# Patient Record
Sex: MALE | Race: WHITE | NOT HISPANIC OR LATINO | Employment: UNEMPLOYED | URBAN - METROPOLITAN AREA
[De-identification: names, ages, dates, MRNs, and addresses within clinical notes are randomized per-mention and may not be internally consistent; named-entity substitution may affect disease eponyms.]

---

## 2017-05-24 ENCOUNTER — OFFICE VISIT (OUTPATIENT)
Dept: PAIN MEDICINE | Facility: CLINIC | Age: 42
End: 2017-05-24

## 2017-05-24 VITALS
OXYGEN SATURATION: 97 % | DIASTOLIC BLOOD PRESSURE: 81 MMHG | WEIGHT: 158.8 LBS | RESPIRATION RATE: 18 BRPM | HEART RATE: 79 BPM | TEMPERATURE: 97.9 F | BODY MASS INDEX: 20.39 KG/M2 | SYSTOLIC BLOOD PRESSURE: 119 MMHG

## 2017-05-24 DIAGNOSIS — M54.14 THORACIC RADICULITIS: ICD-10-CM

## 2017-05-24 DIAGNOSIS — G89.4 CHRONIC PAIN SYNDROME: Primary | ICD-10-CM

## 2017-05-24 DIAGNOSIS — M54.6 THORACIC SPINE PAIN: ICD-10-CM

## 2017-05-24 DIAGNOSIS — M54.16 LUMBAR RADICULOPATHY: ICD-10-CM

## 2017-05-24 LAB
POC AMPHETAMINES: NEGATIVE
POC BARBITURATES: NEGATIVE
POC BENZODIAZEPHINES: NEGATIVE
POC COCAINE: NEGATIVE
POC METHADONE: NEGATIVE
POC METHAMPHETAMINE SCREEN URINE: NEGATIVE
POC OPIATES: NEGATIVE
POC OXYCODONE: NEGATIVE
POC PHENCYCLIDINE: NEGATIVE
POC PROPOXYPHENE: NEGATIVE
POC THC: POSITIVE
POC TRICYCLIC ANTIDEPRESSANTS: POSITIVE

## 2017-05-24 PROCEDURE — 99214 OFFICE O/P EST MOD 30 MIN: CPT | Performed by: ANESTHESIOLOGY

## 2017-05-24 PROCEDURE — 80305 DRUG TEST PRSMV DIR OPT OBS: CPT | Performed by: ANESTHESIOLOGY

## 2017-05-24 RX ORDER — CYCLOBENZAPRINE HCL 10 MG
TABLET ORAL
Refills: 0 | COMMUNITY
Start: 2017-04-26

## 2017-05-24 RX ORDER — ERGOCALCIFEROL 1.25 MG/1
CAPSULE ORAL
Refills: 0 | COMMUNITY
Start: 2017-04-26

## 2017-05-24 RX ORDER — PAROXETINE HYDROCHLORIDE 40 MG/1
TABLET, FILM COATED ORAL
Refills: 0 | COMMUNITY
Start: 2017-05-17 | End: 2018-02-22 | Stop reason: ALTCHOICE

## 2017-08-07 ENCOUNTER — OFFICE VISIT (OUTPATIENT)
Dept: PAIN MEDICINE | Facility: CLINIC | Age: 42
End: 2017-08-07

## 2017-08-07 VITALS
SYSTOLIC BLOOD PRESSURE: 135 MMHG | DIASTOLIC BLOOD PRESSURE: 90 MMHG | BODY MASS INDEX: 20.12 KG/M2 | WEIGHT: 156.8 LBS | OXYGEN SATURATION: 96 % | HEART RATE: 105 BPM | RESPIRATION RATE: 18 BRPM | HEIGHT: 74 IN | TEMPERATURE: 98.8 F

## 2017-08-07 DIAGNOSIS — M54.16 LUMBAR RADICULOPATHY: ICD-10-CM

## 2017-08-07 DIAGNOSIS — M54.12 CERVICAL RADICULOPATHY: ICD-10-CM

## 2017-08-07 DIAGNOSIS — G89.4 CHRONIC PAIN SYNDROME: Primary | ICD-10-CM

## 2017-08-07 DIAGNOSIS — M54.6 PAIN IN THORACIC SPINE AT MULTIPLE SITES: ICD-10-CM

## 2017-08-07 DIAGNOSIS — M54.14 THORACIC RADICULITIS: ICD-10-CM

## 2017-08-07 PROCEDURE — 99214 OFFICE O/P EST MOD 30 MIN: CPT | Performed by: ANESTHESIOLOGY

## 2017-08-07 NOTE — PROGRESS NOTES
CHIEF COMPLAINT  Follow-up for back and neck pain. Mr. Carvajal states that his neck and low back pain is unchanged and his upper and mid-back pain has gotten worse.    Subjective   Korey Carvajal is a 41 y.o. male  who presents to the office for follow-up.He has a history of mid and low back pain.    Worsening pain despite observation and conservative treatment for 10 weeks.      Back Pain   This is a chronic problem. The current episode started more than 1 year ago. The problem occurs constantly. The problem has been gradually worsening since onset. The pain is present in the lumbar spine and thoracic spine. The quality of the pain is described as aching, burning and shooting. Radiates to: shoots to flanks from the mid-thoracic, and into the neck.  shoots to neck to both shoulders.  N/T in fingertips.  The pain is severe. The symptoms are aggravated by bending, standing and twisting. Pertinent negatives include no chest pain, headaches, numbness or weakness. He has tried analgesics, bed rest, ice, muscle relaxant, NSAIDs, walking, heat and home exercises (acupuntcutre.  cupping.  previous failure with NSAIDS at prescription doses for > 6 wks, and this was prior to the May 2017 office visit so we elected not to restart them) for the symptoms. The treatment provided no relief.        PEG Assessment   What number best describes your pain on average in the past week?7  What number best describes how, during the past week, pain has interfered with your enjoyment of life?7  What number best describes how, during the past week, pain has interfered with your general activity?  7      The following portions of the patient's history were reviewed and updated as appropriate: allergies, current medications, past family history, past medical history, past social history, past surgical history and problem list.    Review of Systems   Constitutional: Positive for fatigue.   HENT: Negative for congestion.    Eyes: Positive for  "visual disturbance (blurry vision).   Respiratory: Positive for cough, shortness of breath and wheezing.    Cardiovascular: Positive for palpitations. Negative for chest pain and leg swelling.   Gastrointestinal: Negative for constipation and diarrhea.   Genitourinary: Negative for difficulty urinating.   Musculoskeletal: Positive for back pain and neck pain. Negative for neck stiffness.   Neurological: Negative for weakness, numbness and headaches.   Psychiatric/Behavioral: Positive for sleep disturbance. Negative for suicidal ideas. The patient is nervous/anxious.          Vitals:    08/07/17 0842   BP: 135/90   Pulse: 105   Resp: 18   Temp: 98.8 °F (37.1 °C)   SpO2: 96%   Weight: 156 lb 12.8 oz (71.1 kg)   Height: 74\" (188 cm)   PainSc: 6  Comment: neck pain 6/10   PainLoc: Back           Objective   Physical Exam   Constitutional: He is oriented to person, place, and time. He appears well-developed and well-nourished.   HENT:   Head: Normocephalic and atraumatic.   Right Ear: External ear normal.   Left Ear: External ear normal.   Eyes: Conjunctivae and EOM are normal. Pupils are equal, round, and reactive to light.   Neck: Neck supple. Muscular tenderness present. No rigidity. Decreased range of motion present. No Brudzinski's sign and no Kernig's sign noted.   Pain on neck rotation to 80 deg to the left and 70 deg to the right.  Crepitus is audible.     Pulmonary/Chest: Effort normal.   Abdominal: Soft.   Musculoskeletal:        Thoracic back: He exhibits decreased range of motion, tenderness and pain.        Lumbar back: He exhibits decreased range of motion, tenderness and pain.   Neurological: He is alert and oriented to person, place, and time. He has normal strength. No cranial nerve deficit. He displays a negative Romberg sign. Gait normal.   Reflex Scores:       Patellar reflexes are 1+ on the right side and 1+ on the left side.  Pain on lateral flexion in the thoracic spine with shooting pains out " laterally bilaterally R>L.  Also has positive SLR bilaterally   Skin: Skin is warm and dry.   Psychiatric: He has a normal mood and affect. His behavior is normal.   Nursing note and vitals reviewed.          Assessment/Plan   Korey was seen today for back pain and neck pain.    Diagnoses and all orders for this visit:    Chronic pain syndrome    Thoracic radiculitis  -     Case Request  -     MRI Thoracic Spine Without Contrast; Future    Cervical radiculopathy    Lumbar radiculopathy  -     MRI Lumbar Spine Without Contrast; Future    Pain in thoracic spine at multiple sites  -     MRI Thoracic Spine Without Contrast; Future        --- Follow-up for procedure.....    Thoracic epidural steroid injection, x1, then reevaluate    Prefer to do this therapy after imaging.... He needs a new T & L spine MRI... Not improving now 10.5 wks later with attention to HEP & modalities.  Previous failure with PT.  He has tried acupuncture & cupping since the last visit and did not see improvement with these modalities.  He tries to stay active but to little avail.               TAYLOR REPORT  TAYLOR report has been reviewed and scanned into the patient's chart.  Date of last TAYLOR : as above.  No current use of opioids.        EMR Dragon/Transcription disclaimer:   Much of this encounter note is an electronic transcription/translation of spoken language to printed text. The electronic translation of spoken language may permit erroneous, or at times, nonsensical words or phrases to be inadvertently transcribed; Although I have reviewed the note for such errors, some may still exist.

## 2017-08-29 ENCOUNTER — DOCUMENTATION (OUTPATIENT)
Dept: PAIN MEDICINE | Facility: CLINIC | Age: 42
End: 2017-08-29

## 2017-08-29 ENCOUNTER — OUTSIDE FACILITY SERVICE (OUTPATIENT)
Dept: PAIN MEDICINE | Facility: CLINIC | Age: 42
End: 2017-08-29

## 2017-08-29 PROCEDURE — 62320 NJX INTERLAMINAR CRV/THRC: CPT | Performed by: ANESTHESIOLOGY

## 2017-08-30 NOTE — PROGRESS NOTES
Thoracic Epidural Steroid Injection  [facility]      PREOPERATIVE DIAGNOSIS:   Thoracic radiculopathy R>L, chronic pain syndrome, thoracic spine pain  POSTOPERATIVE DIAGNOSIS:  Same as preop diagnosis    PROCEDURE:   Thoracic Epidural Steroid Injection, Therapeutic Translaminar Injection, with epidurogram, at  T7-8 level    PRE-PROCEDURE DISCUSSION WITH PATIENT:    Risks and complications were discussed with the patient prior to starting the procedure and informed consent was obtained.  We discussed various topics including but not limited to bleeding, infection, injury, paralysis, nerve injury, dural puncture, coma, death, worsening of clinical picture, lack of pain relief, and postprocedural soreness.    SURGEON:  Niall Gray MD    REASON FOR PROCEDURE:    Previous VITALIY was therapeutically significant.  He has radicular pains that seem to eminate from this area     SEDATION:  Versed 6mg & Fentanyl 100 mcg IV  ANESTHETIC:  Marcaine 0.25%  STEROID:   Methylprednisolone (DEPO MEDROL) 80mg/ml    DESCRIPTON OF PROCEDURE:    After obtaining informed consent, I.V. was started in the preop area.   The patient was taken to the operating room and placed in the prone position.  EKG, blood pressure, and pulse oximeter were monitored throughout, and sedation was provided as needed by the RN under my guidance. All pressure points were well padded.  The thoracic spine area was prepped with Chloraprep and draped in a sterile fashion.  Under fluoroscopic guidance, the above mentioned interlaminar space was identified. Skin and subcutaneous tissues were anesthetized with 1% lidocaine in the middle of the space. A 20-gauge Tuohy needle was introduced through the skin and advanced to this interlaminar space and into the epidural space under fluoroscopic guidance and verified with loss-of-resistance technique to air.  After confirming the position of the needle with the fluoroscope with all the views, and after  aspiration was confirmed negative for blood and CSF, 1.5 mL of Omnipaque was injected.  After seeing appropriate epidurogram with lateral and PA views, a total of 3 cc solution was injected, consisting of 1cc of local anesthetic as above, with normal saline and injectable steroid as above.       ESTIMATED BLOOD LOSS:  <5 mL  SPECIMENS:  None    COMPLICATIONS:     No complications were noted. and There was no indication of vascular uptake on live injection of contrast dye.    TOLERANCE & DISCHARGE CONDITION:    The patient tolerated the procedure well.  The patient was transported to the recovery area without difficulties.  The patient was discharged to home under the care of family in stable and satisfactory condition.    PLAN OF CARE:  1. The patient was given our standard instruction sheet.  2. The patient will Return to clinic 2 wks  3. The patient will resume all medications as per the medication reconciliation sheet.

## 2017-09-13 ENCOUNTER — OFFICE VISIT (OUTPATIENT)
Dept: PAIN MEDICINE | Facility: CLINIC | Age: 42
End: 2017-09-13

## 2017-09-13 VITALS
OXYGEN SATURATION: 99 % | RESPIRATION RATE: 16 BRPM | TEMPERATURE: 98.6 F | DIASTOLIC BLOOD PRESSURE: 81 MMHG | WEIGHT: 154 LBS | HEIGHT: 74 IN | BODY MASS INDEX: 19.76 KG/M2 | SYSTOLIC BLOOD PRESSURE: 120 MMHG | HEART RATE: 85 BPM

## 2017-09-13 DIAGNOSIS — M51.36 DDD (DEGENERATIVE DISC DISEASE), LUMBAR: ICD-10-CM

## 2017-09-13 DIAGNOSIS — M54.14 THORACIC RADICULITIS: ICD-10-CM

## 2017-09-13 DIAGNOSIS — M54.6 PAIN IN THORACIC SPINE AT MULTIPLE SITES: Primary | ICD-10-CM

## 2017-09-13 PROCEDURE — 99214 OFFICE O/P EST MOD 30 MIN: CPT | Performed by: NURSE PRACTITIONER

## 2017-09-13 NOTE — PROGRESS NOTES
CHIEF COMPLAINT  Pt has had 50% pain reduction for about 11 days following 8/29/17 T 7-8 VITALIY  Initial evaluation by Payal Garnett   Korey Carvajal is a 41 y.o. male  who presents to the office for follow-up of procedure.  He completed a TESI T7-8   on  8-29-17 performed by Dr. Gray for management of mid back pain. Patient reports 50% relief from the procedure for 11 days.  He is starting to notice that the pain is re-emerging, though not quite to pre-procedure pain level.     Patient was last evaluated by Dr. Gray in office on 8/7/17.  He has a history of back and neck pain.  He presents the office with complaints of increased mid and low back pain.  He is ordered to have an MRI of the thoracic and lumbar spine.  He is also ordered to have a thoracic epidural injection times one.  Dr. Gray request a new  Thoracic and lumbar spine MRI after patient has done 10.5 treatment, weeks with attention HEP and conservative modalities and previous failure PT.  Has tried acupuncture cupping  but no results.    Has a court date for child support. His PCP wrote he is unable to work. HE was wondering if Dr. Gray would be willing to write a letter on his behalf. I sent a message to Dr. Gray in regards to this.     As for the MRI's,t he request has not yet been sent in. He was denied 6-28-17 previously and they would not review again until 90 days after. Will send for request when time is appropriate.    He is requesting repeating a TFLESI. He had this done previously, last performed in 2015. He noted significant relief with this.      Back Pain   This is a chronic problem. The current episode started more than 1 year ago. The problem occurs constantly. The pain is present in the lumbar spine and thoracic spine. The quality of the pain is described as aching, burning and shooting. Radiates to: shoots to flanks from the mid-thoracic, and into the neck.  shoots to neck to both shoulders.  N/T in  fingertips.  The pain is at a severity of 7/10. The pain is severe. The symptoms are aggravated by bending, standing and twisting. Pertinent negatives include no bladder incontinence, bowel incontinence, chest pain, headaches, numbness (fingers,bilat. hands) or weakness. He has tried analgesics, bed rest, ice, muscle relaxant, NSAIDs, walking, heat and home exercises (acupuntcutre.  cupping.  previous failure with NSAIDS at prescription doses for > 6 wks, and this was prior to the May 2017 office visit so we elected not to restart them; TESI--moderate relief) for the symptoms. The treatment provided no relief.      PEG Assessment   What number best describes your pain on average in the past week?7  What number best describes how, during the past week, pain has interfered with your enjoyment of life?7  What number best describes how, during the past week, pain has interfered with your general activity?  7    The following portions of the patient's history were reviewed and updated as appropriate: allergies, current medications, past family history, past medical history, past social history, past surgical history and problem list.    Review of Systems   Constitutional: Positive for activity change (decreased), appetite change (minimal) and fatigue.   HENT: Negative for congestion.    Eyes: Positive for visual disturbance (blurry vision  occasional).   Respiratory: Positive for cough, shortness of breath and wheezing.    Cardiovascular: Positive for palpitations. Negative for chest pain and leg swelling.   Gastrointestinal: Negative for bowel incontinence, constipation, diarrhea and nausea.   Genitourinary: Negative for bladder incontinence and difficulty urinating.   Musculoskeletal: Positive for back pain and neck pain. Negative for neck stiffness.   Neurological: Negative for dizziness, weakness, light-headedness, numbness (fingers,bilat. hands) and headaches.   Psychiatric/Behavioral: Positive for sleep  "disturbance. Negative for suicidal ideas. The patient is nervous/anxious.        Vitals:    09/13/17 0936   BP: 120/81   Pulse: 85   Resp: 16   Temp: 98.6 °F (37 °C)   SpO2: 99%   Weight: 154 lb (69.9 kg)   Height: 74\" (188 cm)   PainSc: 7  Comment: center to R sided mid back pain ranges from 3-8/10   PainLoc: Back         Objective   Physical Exam   Constitutional: He is oriented to person, place, and time. He appears well-developed and well-nourished.   HENT:   Head: Normocephalic and atraumatic.   Nose: Nose normal.   Eyes: Conjunctivae are normal.   Neck: Neck supple.   Pulmonary/Chest: Effort normal.   Musculoskeletal:        Thoracic back: He exhibits decreased range of motion, tenderness and pain.        Lumbar back: He exhibits decreased range of motion, tenderness and pain.   Neurological: He is alert and oriented to person, place, and time. Gait normal.   Reflex Scores:       Patellar reflexes are 1+ on the right side and 1+ on the left side.  positive SLR bilaterally   Psychiatric: He has a normal mood and affect.   Nursing note and vitals reviewed.      Assessment/Plan   Korey was seen today for back pain.    Diagnoses and all orders for this visit:    Pain in thoracic spine at multiple sites    Thoracic radiculitis    DDD (degenerative disc disease), lumbar  -     Case Request      --- TF VITALIY bilateral L4. No blood thinners. Reviewed the procedure at length with the patient.  Included in the review was expectations, complications, risk and benefits.The procedure was described in detail and the risks, benefits and alternatives were discussed with the patient (including but not limited to: bleeding, infection, nerve damage, worsening of pain, inability to perform injection, paralysis, seizures, and death) who agreed to proceed.  Discussed the potential for sedation if warranted/wanted.  Questions were answered and in a way the patient could understand.  Patient verbalized understanding and wishes to " proceed.  This intervention will be ordered.  --- proceed with MRI's when approved by insurance.  --- Follow-up after procedure or sooner if needed.       TAYLOR REPORT      TAYLOR report has been reviewed and scanned into the patient's chart.    Date of last TAYLOR : 9-11-17          EMR Dragon/Transcription disclaimer:   Much of this encounter note is an electronic transcription/translation of spoken language to printed text. The electronic translation of spoken language may permit erroneous, or at times, nonsensical words or phrases to be inadvertently transcribed; Although I have reviewed the note for such errors, some may still exist.

## 2017-09-19 ENCOUNTER — DOCUMENTATION (OUTPATIENT)
Dept: PAIN MEDICINE | Facility: CLINIC | Age: 42
End: 2017-09-19

## 2017-09-19 ENCOUNTER — OUTSIDE FACILITY SERVICE (OUTPATIENT)
Dept: PAIN MEDICINE | Facility: CLINIC | Age: 42
End: 2017-09-19

## 2017-09-19 PROCEDURE — 64483 NJX AA&/STRD TFRM EPI L/S 1: CPT | Performed by: ANESTHESIOLOGY

## 2017-09-19 NOTE — PROGRESS NOTES
Bilateral L4 Lumbar Transforaminal Epidural Steroid Injection  [facility]    PREOPERATIVE DIAGNOSIS:  Lumbar Radiculopathy and Lumbar Degenerative Disc Disease    POSTOPERATIVE DIAGNOSIS:  Same as preop diagnosis    PROCEDURE:  CPT 31429(-50) --  Diagnostic Transforaminal Epidural Steroid Injection at the L4 level, bilaterally    PRE-PROCEDURE DISCUSSION WITH PATIENT:    Risks and complications were discussed with the patient prior to starting the procedure and informed consent was obtained.  We discussed various topics including but not limited to bleeding, infection, injury, nerve injury, paralysis, coma, death, postprocedural painful flare-up, postprocedural site soreness, and a lack of pain relief.  We discussed the diagnostic aspect of transforaminal epidural / selective nerve root blockade.    SURGEON:  Niall Gray MD    REASON FOR PROCEDURE:    Diagnostic injection at this level is needed, Previous clinically significant therapeutic effect is noted. and Radicular pain pattern seems consistent with this dermatome.    SEDATION:  Versed 6mg & Fentanyl 100 mcg IV  ANESTHETIC:  Marcaine 0.25%  STEROID:  Methylprednisolone (DEPO MEDROL) 80mg/ml    DESCRIPTON OF PROCEDURE:  After obtaining informed consent, an I.V. was started in the preoperative area. The patient taken to the operating room and was placed in the prone position with a pillow under the abdomen.  All pressure points were well padded.  EKG, blood pressure, and pulse oximeter were monitored.  The lumbosacral area was prepped with Chloraprep and draped in a sterile fashion. Under fluoroscopic guidance in an oblique dimension, the transverse process of the aforementioned vertebra at the junction of the body at 6 o'clock position on one side was identified. Skin and subcutaneous tissue was anesthetized with 1% lidocaine. A 22-gauge spinal needle was introduced under fluoroscopic guidance at the above junction into the foramen  without parasthesias and into the epidural space. After confirming the position of the needle with PA, lateral, and oblique fluoroscopic views, aspiration was checked and was clear of blood or CSF.  Next, 1 mL of Omnipaque was injected. After seeing adequate spread on the corresponding nerve root, a total volume 2mL of injectate containing 0.5ml of the above mentioned local anesthetic, 1 ml saline,  and half of the above mentioned corticosteroid was injected into the epidural space.    The needle was removed intact.      Next, the same vertebral level and corresponding foramen on the contralateral side was visualized with fluoroscopy in an oblique view, and a similar approach was used to place the spinal needle in that foramen.  After confirming the position of the needle with PA, lateral, and oblique fluoroscopic views, aspiration was checked and was clear of blood or CSF.  Next, 1 mL of Omnipaque was injected. After seeing adequate spread on the corresponding nerve root, a total volume 2mL of injectate containing 0.5ml of the above mentioned local anesthetic, 1 ml saline, and half of the above mentioned corticosteroid was injected into the epidural space. The needle was removed intact.  Vital signs were stable throughout.      ESTIMATED BLOOD LOSS:  <5 mL  SPECIMENS:  none    COMPLICATIONS:   No complications were noted. and There was no indication of vascular uptake on live injection of contrast dye.    TOLERANCE & DISCHARGE CONDITION:    The patient tolerated the procedure well.  The patient was transported to the recovery area without difficulties.  The patient was discharged to home under the care of family in stable and satisfactory condition.    PLAN OF CARE:  1. The patient was given our standard instruction sheet.  2. The patient will Return to clinic 2 wks.  3. The patient will resume all medications as per the medication reconciliation sheet.

## 2017-10-02 ENCOUNTER — LAB (OUTPATIENT)
Dept: LAB | Facility: HOSPITAL | Age: 42
End: 2017-10-02

## 2017-10-02 ENCOUNTER — OFFICE VISIT (OUTPATIENT)
Dept: PAIN MEDICINE | Facility: CLINIC | Age: 42
End: 2017-10-02

## 2017-10-02 VITALS
DIASTOLIC BLOOD PRESSURE: 94 MMHG | SYSTOLIC BLOOD PRESSURE: 136 MMHG | RESPIRATION RATE: 16 BRPM | HEIGHT: 74 IN | BODY MASS INDEX: 20.28 KG/M2 | TEMPERATURE: 97.3 F | HEART RATE: 88 BPM | WEIGHT: 158 LBS | OXYGEN SATURATION: 99 %

## 2017-10-02 DIAGNOSIS — M25.50 ARTHRALGIA, UNSPECIFIED JOINT: ICD-10-CM

## 2017-10-02 DIAGNOSIS — M54.6 PAIN IN THORACIC SPINE AT MULTIPLE SITES: ICD-10-CM

## 2017-10-02 DIAGNOSIS — M54.14 THORACIC RADICULITIS: ICD-10-CM

## 2017-10-02 DIAGNOSIS — M51.36 DDD (DEGENERATIVE DISC DISEASE), LUMBAR: ICD-10-CM

## 2017-10-02 DIAGNOSIS — M54.6 PAIN IN THORACIC SPINE AT MULTIPLE SITES: Primary | ICD-10-CM

## 2017-10-02 DIAGNOSIS — M54.2 NECK PAIN: ICD-10-CM

## 2017-10-02 DIAGNOSIS — M47.812 CERVICAL FACET JOINT SYNDROME: ICD-10-CM

## 2017-10-02 LAB
CHROMATIN AB SERPL-ACNC: <10 IU/ML (ref 0–14)
CRP SERPL-MCNC: 0.34 MG/DL (ref 0–0.5)
ERYTHROCYTE [SEDIMENTATION RATE] IN BLOOD: 3 MM/HR (ref 0–15)

## 2017-10-02 PROCEDURE — 86431 RHEUMATOID FACTOR QUANT: CPT | Performed by: NURSE PRACTITIONER

## 2017-10-02 PROCEDURE — 85651 RBC SED RATE NONAUTOMATED: CPT

## 2017-10-02 PROCEDURE — 81374 HLA I TYPING 1 ANTIGEN LR: CPT | Performed by: NURSE PRACTITIONER

## 2017-10-02 PROCEDURE — 99214 OFFICE O/P EST MOD 30 MIN: CPT | Performed by: NURSE PRACTITIONER

## 2017-10-02 PROCEDURE — 36415 COLL VENOUS BLD VENIPUNCTURE: CPT

## 2017-10-02 PROCEDURE — 86140 C-REACTIVE PROTEIN: CPT | Performed by: NURSE PRACTITIONER

## 2017-10-02 PROCEDURE — 86038 ANTINUCLEAR ANTIBODIES: CPT | Performed by: NURSE PRACTITIONER

## 2017-10-02 NOTE — PROGRESS NOTES
CHIEF COMPLAINT    F/U back pain. Pt had significant relief following procedure. States pain is worse in thoracic than lumbar area.    Subjective   Korey Carvajal is a 41 y.o. male  who presents to the office for follow-up of procedure.  He completed a bilateral L4 TFESI   on  9-19-17 performed by Dr. Gray for management of back pain. Patient reports 60% ongoing relief from the procedure.     He is having pain in his mid-back as well. Has had thoracic VITALIY. Did have some improvement.     Complains of pain in his neck. Today his pain is 7/10VAS. Describes the pain as continuous and worsening. Has previously had cervical FJN with positive results. Is wanting to repeat this.     Has complains of widespread joint pain. He used to skateboard and believes this may be contributing to his pain. However, he thinks if he has arthritis, it is worse than it should be for someone his age.     Back Pain   This is a chronic problem. The current episode started more than 1 year ago. The problem occurs constantly. The pain is present in the lumbar spine and thoracic spine. The quality of the pain is described as aching, burning and shooting. Radiates to: shoots to flanks from the mid-thoracic, and into the neck.  shoots to neck to both shoulders.  N/T in fingertips.  The pain is at a severity of 7/10. The pain is severe. The symptoms are aggravated by bending, standing and twisting. Pertinent negatives include no bladder incontinence, bowel incontinence, chest pain, headaches, numbness (fingers,bilat. hands) or weakness. He has tried analgesics, bed rest, ice, muscle relaxant, NSAIDs, walking, heat and home exercises (acupuntcutre.  cupping.  previous failure with NSAIDS at prescription doses for > 6 wks, and this was prior to the May 2017 office visit so we elected not to restart them; TESI--moderate relief) for the symptoms. The treatment provided no relief.   Neck Pain    This is a chronic problem. The current episode  started more than 1 year ago. The problem occurs constantly. The problem has been gradually worsening. The pain is associated with nothing. The pain is at a severity of 7/10. The symptoms are aggravated by bending and twisting. Stiffness is present in the morning. Pertinent negatives include no chest pain, headaches, numbness (fingers,bilat. hands) or weakness. He has tried bed rest, muscle relaxants and NSAIDs (pervious cervical MBB with significantly positive results. ) for the symptoms. The treatment provided mild relief.   Joint Pain   This is a chronic problem. The current episode started more than 1 year ago. The problem occurs constantly. The problem has been gradually worsening. Associated symptoms include arthralgias, fatigue and neck pain. Pertinent negatives include no chest pain, congestion, coughing, headaches, nausea, numbness (fingers,bilat. hands) or weakness. Nothing aggravates the symptoms. He has tried nothing for the symptoms.      PEG Assessment   What number best describes your pain on average in the past week?7  What number best describes how, during the past week, pain has interfered with your enjoyment of life?8  What number best describes how, during the past week, pain has interfered with your general activity?  6    The following portions of the patient's history were reviewed and updated as appropriate: allergies, current medications, past family history, past medical history, past social history, past surgical history and problem list.    Review of Systems   Constitutional: Positive for appetite change (minimal) and fatigue. Negative for activity change.   HENT: Negative for congestion.    Eyes: Positive for visual disturbance (blurry vision  occasional).   Respiratory: Negative for cough, shortness of breath and wheezing.    Cardiovascular: Positive for palpitations. Negative for chest pain and leg swelling.   Gastrointestinal: Negative for bowel incontinence, constipation, diarrhea  "and nausea.   Genitourinary: Negative for bladder incontinence and difficulty urinating.   Musculoskeletal: Positive for arthralgias, back pain and neck pain. Negative for neck stiffness.   Neurological: Negative for dizziness, weakness, light-headedness, numbness (fingers,bilat. hands) and headaches.   Psychiatric/Behavioral: Positive for sleep disturbance. Negative for suicidal ideas. The patient is nervous/anxious.        Vitals:    10/02/17 1025   BP: 136/94   Pulse: 88   Resp: 16   Temp: 97.3 °F (36.3 °C)   SpO2: 99%   Weight: 158 lb (71.7 kg)   Height: 74\" (188 cm)   PainSc:   7   PainLoc: Back  Comment: thoracic     Objective   Physical Exam   Constitutional: He is oriented to person, place, and time. He appears well-developed and well-nourished.   HENT:   Head: Normocephalic and atraumatic.   Nose: Nose normal.   Eyes: Conjunctivae are normal.   Neck: Trachea normal. Neck supple. Spinous process tenderness (bilateral C4-C7 facet tenderness--moderate) present. No muscular tenderness present. Decreased range of motion present.   Pulmonary/Chest: Effort normal.   Musculoskeletal:        Thoracic back: He exhibits decreased range of motion, tenderness and pain.        Lumbar back: He exhibits decreased range of motion, tenderness and pain.   Neurological: He is alert and oriented to person, place, and time. Gait normal.   Reflex Scores:       Bicep reflexes are 1+ on the right side and 1+ on the left side.       Brachioradialis reflexes are 1+ on the right side and 1+ on the left side.       Patellar reflexes are 1+ on the right side and 1+ on the left side.  NEGATIVE SLR bilaterally   Psychiatric: He has a normal mood and affect.   Nursing note and vitals reviewed.      Assessment/Plan   Korey was seen today for back pain.    Diagnoses and all orders for this visit:    Pain in thoracic spine at multiple sites  -     Rheumatoid Factor; Future  -     HEVER With / DsDNA, RNP, Sjogrens A / B, Guerrero; Future  -     " C-reactive Protein; Future  -     Sedimentation Rate; Future  -     HLA-B27 Antigen; Future  -     MRI Thoracic Spine Without Contrast; Future    DDD (degenerative disc disease), lumbar  -     Rheumatoid Factor; Future  -     HEVER With / DsDNA, RNP, Sjogrens A / B, Smith; Future  -     C-reactive Protein; Future  -     Sedimentation Rate; Future  -     HLA-B27 Antigen; Future  -     MRI Lumbar Spine Without Contrast; Future    Thoracic radiculitis  -     MRI Thoracic Spine Without Contrast; Future    Arthralgia, unspecified joint  -     Rheumatoid Factor; Future  -     HEVER With / DsDNA, RNP, Sjogrens A / B, Smith; Future  -     C-reactive Protein; Future  -     Sedimentation Rate; Future  -     HLA-B27 Antigen; Future    Cervical facet joint syndrome  -     Case Request    Neck pain  -     Case Request      --- cervical FJN. No blood thinners. Reviewed the procedure at length with the patient.  Included in the review was expectations, complications, risk and benefits.The procedure was described in detail and the risks, benefits and alternatives were discussed with the patient (including but not limited to: bleeding, infection, nerve damage, worsening of pain, inability to perform injection, paralysis, seizures, and death) who agreed to proceed.  Discussed the potential for sedation if warranted/wanted.  Questions were answered and in a way the patient could understand.  Patient verbalized understanding and wishes to proceed.  This intervention will be ordered.  --- Thoracic MRI and Lumbar MRI.  --- RHeumatoid panel with HLA-b27.  --- Follow-up after procedure or sooner if needed         TAYLOR REPORT      TAYLOR report has been reviewed and scanned into the patient's chart.    Date of last TAYLOR : 9-29-17      EMR Dragon/Transcription disclaimer:   Much of this encounter note is an electronic transcription/translation of spoken language to printed text. The electronic translation of spoken language may permit  erroneous, or at times, nonsensical words or phrases to be inadvertently transcribed; Although I have reviewed the note for such errors, some may still exist.

## 2017-10-04 LAB — ANA SER QL: NEGATIVE

## 2017-10-05 LAB — HLA-B27 QL NAA+PROBE: NEGATIVE

## 2017-11-02 ENCOUNTER — OUTSIDE FACILITY SERVICE (OUTPATIENT)
Dept: PAIN MEDICINE | Facility: CLINIC | Age: 42
End: 2017-11-02

## 2017-11-02 ENCOUNTER — DOCUMENTATION (OUTPATIENT)
Dept: PAIN MEDICINE | Facility: CLINIC | Age: 42
End: 2017-11-02

## 2017-11-02 PROCEDURE — 64492 INJ PARAVERT F JNT C/T 3 LEV: CPT | Performed by: ANESTHESIOLOGY

## 2017-11-02 PROCEDURE — 64490 INJ PARAVERT F JNT C/T 1 LEV: CPT | Performed by: ANESTHESIOLOGY

## 2017-11-02 PROCEDURE — 64491 INJ PARAVERT F JNT C/T 2 LEV: CPT | Performed by: ANESTHESIOLOGY

## 2017-11-02 NOTE — PROGRESS NOTES
Bilateral C4-7 Cervical Medial Branch Blockade  [facility]      PREOPERATIVE DIAGNOSIS:  Cervical spondylosis without myelopathy   POSTOPERATIVE DIAGNOSIS:  Same as preoperative diagnosis    PROCEDURE:    Cervical Facet Nerve (medial branch) Blocks, with Fluoroscopy:  LEVELS C4, C5, C6, and C7, to block facet joints C4-5 and C5-6 and C6-7 bilaterally  • [id number]  - Bilateral Cervical Facet blocks, 1st level  • [id number]  - Bilateral Cervical Facet blocks, 2nd level  • [id number]  - Bilateral Cervical Facet blocks, 3rd level    PRE-PROCEDURE DISCUSSION WITH PATIENT:    Risks and complications were discussed with the patient prior to starting the procedure and informed consent was obtained.    SURGEON:  Niall Gray MD    REASON FOR PROCEDURE:    The patient complains of pain that seems to have a significant axial component Pain on extension of the cervical spine Previous tx+ effect after CMBB in the past.  Painful levels verified on exam under fluoro today.    SEDATION:  Versed 6mg & Fentanyl 100 mcg IV  ANESTHETIC:   Marcaine 0.5%  STEROID:  Dexamethasone 8mg  TOTAL VOLUME OF SOLUTION:  8 mL    DESCRIPTON OF PROCEDURE:  After obtaining informed consent, the patient was placed in the prone position. IV access was obtained.  EKG, blood pressure, and pulse oximeter were monitored and all sedation was administered by an RN under my direct guidance.  The cervical area was prepped with Chloraprep and draped with sterile barrier. Under fluoroscopic guidance the waists of the C4 through C7 vertebra on each side were identified. Skin and subcutaneous tissue was then anesthetized with 1% lidocaine 1mL at each point. Then spinal needles were introduced under fluoroscopic guidance at the waist of these vertebra on one side. After confirming the position of the needle under fluoroscopic PA and lateral views, and confirming negative aspiration of blood and CSF, 0.25 mL of Omnipaque was injected.  Proper  spread and lack of vascular uptake was demonstrated.  A solution was prepared as above, and 1 mL of that solution was injected very slowly each level.   In similar fashion, this procedure was repeated at the same levels on the contralateral side.  Needles were removed intact from all levels.  Vitals were stable throughout.     ESTIMATED BLOOD LOSS:  minimal  SPECIMENS:  None    COMPLICATIONS:    No complications were noted., There was no indication of vascular uptake on live injection of contrast dye. and There was no indication of intrathecal uptake on live injection of contrast dye.    TOLERANCE & DISCHARGE CONDITION:    The patient tolerated the procedure well.  The patient was transported to the recovery area without difficulties.  The patient was discharged to home under the care of family in stable and satisfactory condition.  The patient did notice improvement in pain on extension and rotation of the cervical spine.    PLAN OF CARE:  1. The patient was given our standard instruction sheet.  2. We discussed that Cervical Medial Branch Blockade is a diagnostic procedure in consideration for radiofrequency ablation if two diagnostic procedures proved to be positive for significant benefit.  If sustained relief of six to eight weeks is obtained, then an alternative plan could be therapeutic cervical medial branch blocks on an as-needed basis.  3. The patient is asked to keep a pain log hourly for 8 hours postoperatively today.  4. The patient will Return to clinic 1 wk and Plan for Thoracic VITALIY for Thoracic radicular pain in the future.  5. The patient will resume all medications as per the medication reconciliation sheet.

## 2017-11-10 ENCOUNTER — OFFICE VISIT (OUTPATIENT)
Dept: PAIN MEDICINE | Facility: CLINIC | Age: 42
End: 2017-11-10

## 2017-11-10 VITALS
OXYGEN SATURATION: 99 % | WEIGHT: 152 LBS | RESPIRATION RATE: 16 BRPM | TEMPERATURE: 97.3 F | HEART RATE: 106 BPM | DIASTOLIC BLOOD PRESSURE: 90 MMHG | HEIGHT: 74 IN | BODY MASS INDEX: 19.51 KG/M2 | SYSTOLIC BLOOD PRESSURE: 134 MMHG

## 2017-11-10 DIAGNOSIS — M47.812 CERVICAL FACET JOINT SYNDROME: ICD-10-CM

## 2017-11-10 DIAGNOSIS — M54.14 THORACIC RADICULITIS: ICD-10-CM

## 2017-11-10 DIAGNOSIS — M51.36 DDD (DEGENERATIVE DISC DISEASE), LUMBAR: Primary | ICD-10-CM

## 2017-11-10 DIAGNOSIS — M54.2 NECK PAIN: ICD-10-CM

## 2017-11-10 DIAGNOSIS — M54.6 PAIN IN THORACIC SPINE AT MULTIPLE SITES: ICD-10-CM

## 2017-11-10 PROCEDURE — 99213 OFFICE O/P EST LOW 20 MIN: CPT | Performed by: NURSE PRACTITIONER

## 2017-11-10 NOTE — PROGRESS NOTES
"CHIEF COMPLAINT  Pt reports 50% pain reduction of neck pain following C4-7 MBB      Subjective   Korey Carvajal is a 42 y.o. male  who presents to the office for follow-up of procedure.  He completed a bilateral C4-C7 FJN   on  11-2-17 performed by Dr. Gray for management of neck pain. Patient reports moderate to significant relief from the procedure.  \"I'm actually doing pretty well.\"    He completed a bilateral L4 TFESI   on  9-19-17 performed by Dr. Gray for management of back pain.    He completed a TESI T7-8 on 8-29-17 performed by Dr. Gray for management of mid back pain.    At his last office visit, he was ordered to have lab-work which he did complete. All of this was normal and this was relayed to patient.     His thoracic and lumbar MRI were denied by insurance. States he needs 6 weeks of PT.    Complains of pain in his neck, mid-back and low back. His mid back is his primary complaint and pain is 6/10VAS. Describes the pain as fairly continuous. Pain increases with activity and prolonged positioning; pain decreases with injections.    Back Pain   This is a chronic problem. The current episode started more than 1 year ago. The problem occurs constantly. The pain is present in the lumbar spine and thoracic spine. The quality of the pain is described as aching, burning and shooting. Radiates to: shoots to flanks from the mid-thoracic, and into the neck.  shoots to neck to both shoulders.  N/T in fingertips.  The pain is moderate. The symptoms are aggravated by bending, standing and twisting. Pertinent negatives include no bladder incontinence, bowel incontinence, chest pain, headaches, numbness (fingers,bilat. hands) or weakness. He has tried analgesics, bed rest, ice, muscle relaxant, NSAIDs, walking, heat and home exercises (acupuntcutre.  cupping.  previous failure with NSAIDS at prescription doses for > 6 wks, and this was prior to the May 2017 office visit so we elected not to restart them; " TESI--moderate relief) for the symptoms. The treatment provided no relief.   Neck Pain    This is a chronic problem. The current episode started more than 1 year ago. The problem occurs constantly. The pain is associated with nothing. The pain is at a severity of 3/10. The symptoms are aggravated by bending and twisting. Stiffness is present in the morning. Pertinent negatives include no chest pain, headaches, numbness (fingers,bilat. hands) or weakness. He has tried bed rest, muscle relaxants and NSAIDs (pervious cervical MBB with significantly positive results. ) for the symptoms. The treatment provided mild relief.   Joint Pain   This is a chronic problem. The current episode started more than 1 year ago. The problem occurs constantly. The problem has been gradually worsening. Associated symptoms include arthralgias, fatigue and neck pain. Pertinent negatives include no chest pain, congestion, coughing, headaches, nausea, numbness (fingers,bilat. hands) or weakness. Nothing aggravates the symptoms. He has tried nothing for the symptoms.      PEG Assessment   What number best describes your pain on average in the past week?6  What number best describes how, during the past week, pain has interfered with your enjoyment of life?4  What number best describes how, during the past week, pain has interfered with your general activity?  4    The following portions of the patient's history were reviewed and updated as appropriate: allergies, current medications, past family history, past medical history, past social history, past surgical history and problem list.    Review of Systems   Constitutional: Positive for appetite change (minimal) and fatigue. Negative for activity change.   HENT: Negative for congestion.    Eyes: Positive for visual disturbance (blurry vision  occasional).   Respiratory: Negative for cough, shortness of breath and wheezing.    Cardiovascular: Positive for palpitations. Negative for chest pain  "and leg swelling.   Gastrointestinal: Negative for bowel incontinence, constipation, diarrhea and nausea.   Genitourinary: Negative for bladder incontinence and difficulty urinating.   Musculoskeletal: Positive for arthralgias, back pain and neck pain. Negative for neck stiffness.   Neurological: Negative for dizziness, weakness, light-headedness, numbness (fingers,bilat. hands) and headaches.   Psychiatric/Behavioral: Positive for sleep disturbance. Negative for suicidal ideas. The patient is nervous/anxious.        Vitals:    11/10/17 1030   BP: 134/90   Pulse: 106   Resp: 16   Temp: 97.3 °F (36.3 °C)   SpO2: 99%   Weight: 152 lb (68.9 kg)   Height: 74\" (188 cm)   PainSc: 3  Comment: neck pain ranges from 3-4/10   PainLoc: Neck     Objective   Physical Exam   Constitutional: He is oriented to person, place, and time. He appears well-developed and well-nourished.   HENT:   Head: Normocephalic and atraumatic.   Nose: Nose normal.   Eyes: Conjunctivae are normal.   Neck: Trachea normal. Neck supple. Spinous process tenderness (bilateral C4-C7 facet tenderness--mild) present. No muscular tenderness present. Decreased range of motion present.   Pulmonary/Chest: Effort normal.   Musculoskeletal:        Thoracic back: He exhibits decreased range of motion, tenderness and pain.        Lumbar back: He exhibits decreased range of motion, tenderness and pain.   Neurological: He is alert and oriented to person, place, and time. Gait normal.   Reflex Scores:       Bicep reflexes are 1+ on the right side and 1+ on the left side.       Brachioradialis reflexes are 1+ on the right side and 1+ on the left side.       Patellar reflexes are 1+ on the right side and 1+ on the left side.  Psychiatric: He has a normal mood and affect. His speech is normal and behavior is normal. Judgment and thought content normal. Cognition and memory are normal.   Nursing note and vitals reviewed.      Assessment/Plan   Korey was seen today for " neck pain.    Diagnoses and all orders for this visit:    DDD (degenerative disc disease), lumbar  -     Ambulatory Referral to Physical Therapy Evaluate and treat    Cervical facet joint syndrome    Neck pain    Pain in thoracic spine at multiple sites  -     Ambulatory Referral to Physical Therapy Evaluate and treat  -     Case Request    Thoracic radiculitis  -     Ambulatory Referral to Physical Therapy Evaluate and treat  -     Case Request      --- Refer to PT for mid and low back pain.  --- Repeat TESI. no blood thinners. Reviewed the procedure at length with the patient.  Included in the review was expectations, complications, risk and benefits.The procedure was described in detail and the risks, benefits and alternatives were discussed with the patient (including but not limited to: bleeding, infection, nerve damage, worsening of pain, inability to perform injection, paralysis, seizures, and death) who agreed to proceed.  Discussed the potential for sedation if warranted/wanted.  Questions were answered and in a way the patient could understand.  Patient verbalized understanding and wishes to proceed.  This intervention will be ordered.  --- Follow-up after procedure or sooner if needed.         TAYLOR REPORT    TAYLOR report has been reviewed and scanned into the patient's chart.    Date of last TAYLOR : 11-8-17          EMR Dragon/Transcription disclaimer:   Much of this encounter note is an electronic transcription/translation of spoken language to printed text. The electronic translation of spoken language may permit erroneous, or at times, nonsensical words or phrases to be inadvertently transcribed; Although I have reviewed the note for such errors, some may still exist.

## 2017-11-30 ENCOUNTER — HOSPITAL ENCOUNTER (OUTPATIENT)
Dept: PHYSICAL THERAPY | Facility: HOSPITAL | Age: 42
Setting detail: THERAPIES SERIES
Discharge: HOME | End: 2017-11-30

## 2018-01-25 ENCOUNTER — OUTSIDE FACILITY SERVICE (OUTPATIENT)
Dept: PAIN MEDICINE | Facility: CLINIC | Age: 43
End: 2018-01-25

## 2018-01-25 ENCOUNTER — DOCUMENTATION (OUTPATIENT)
Dept: PAIN MEDICINE | Facility: CLINIC | Age: 43
End: 2018-01-25

## 2018-01-25 PROCEDURE — 62321 NJX INTERLAMINAR CRV/THRC: CPT | Performed by: ANESTHESIOLOGY

## 2018-01-29 NOTE — PROGRESS NOTES
Thoracic Epidural Steroid Injection  [facility]      PREOPERATIVE DIAGNOSIS:   Thoracic radiculopathy bilateral, thoracic spine pain  POSTOPERATIVE DIAGNOSIS:  Same as preop diagnosis    PROCEDURE:   Thoracic Epidural Steroid Injection, Therapeutic Translaminar Injection, with epidurogram, at  T7-8 level    PRE-PROCEDURE DISCUSSION WITH PATIENT:    Risks and complications were discussed with the patient prior to starting the procedure and informed consent was obtained.  We discussed various topics including but not limited to bleeding, infection, injury, paralysis, nerve injury, dural puncture, coma, death, worsening of clinical picture, lack of pain relief, and postprocedural soreness.    SURGEON:  Niall Gray MD    REASON FOR PROCEDURE:    Previously noted therapeutic effect of clinical significance; recurrent pain.     SEDATION:  Versed 6mg & Fentanyl 100 mcg IV  ANESTHETIC:  Marcaine 0.25%  STEROID:   Methylprednisolone (DEPO MEDROL) 80mg/ml    DESCRIPTON OF PROCEDURE:    After obtaining informed consent, I.V. was started in the preop area.   The patient was taken to the operating room and placed in the prone position.  EKG, blood pressure, and pulse oximeter were monitored throughout, and sedation was provided as needed by the RN under my guidance. All pressure points were well padded.  The thoracic spine area was prepped with Chloraprep and draped in a sterile fashion.  Under fluoroscopic guidance, the above mentioned interlaminar space was identified. Skin and subcutaneous tissues were anesthetized with 1% lidocaine in the middle of the space. A 20-gauge Tuohy needle was introduced through the skin and advanced to this interlaminar space and into the epidural space under fluoroscopic guidance and verified with loss-of-resistance technique to air.  After confirming the position of the needle with the fluoroscope with all the views, and after aspiration was confirmed negative for blood  and CSF, 1.5 mL of Omnipaque was injected.  After seeing appropriate epidurogram with lateral and PA views, a total of 3 cc solution was injected, consisting of 1cc of local anesthetic as above, with normal saline and injectable steroid as above.       ESTIMATED BLOOD LOSS:  <5 mL  SPECIMENS:  None    COMPLICATIONS:     No complications were noted., There was no indication of vascular uptake on live injection of contrast dye., There was no indication of intrathecal uptake on live injection of contrast dye., There was not any evidence of dural puncture.   and The patient did not have any signs of postprocedure numbness nor weakness.    TOLERANCE & DISCHARGE CONDITION:    The patient tolerated the procedure well.  The patient was transported to the recovery area without difficulties.  The patient was discharged to home under the care of family in stable and satisfactory condition.    PLAN OF CARE:  1. The patient was given our standard instruction sheet.  2. The patient will Repeat injection in 2-4 wks PRN  3. The patient will resume all medications as per the medication reconciliation sheet.

## 2018-02-22 ENCOUNTER — OFFICE VISIT (OUTPATIENT)
Dept: CARDIOLOGY | Facility: CLINIC | Age: 43
End: 2018-02-22

## 2018-02-22 VITALS
SYSTOLIC BLOOD PRESSURE: 123 MMHG | HEIGHT: 74 IN | BODY MASS INDEX: 19.51 KG/M2 | DIASTOLIC BLOOD PRESSURE: 83 MMHG | HEART RATE: 108 BPM | WEIGHT: 152 LBS

## 2018-02-22 DIAGNOSIS — G47.33 OBSTRUCTIVE SLEEP APNEA SYNDROME: ICD-10-CM

## 2018-02-22 DIAGNOSIS — R07.2 PRECORDIAL PAIN: Primary | ICD-10-CM

## 2018-02-22 PROCEDURE — 99203 OFFICE O/P NEW LOW 30 MIN: CPT | Performed by: INTERNAL MEDICINE

## 2018-02-22 PROCEDURE — 93000 ELECTROCARDIOGRAM COMPLETE: CPT | Performed by: INTERNAL MEDICINE

## 2018-02-22 RX ORDER — DIAZEPAM 5 MG/1
TABLET ORAL
Refills: 0 | COMMUNITY
Start: 2018-02-02

## 2018-03-06 PROBLEM — G47.33 OBSTRUCTIVE SLEEP APNEA SYNDROME: Status: ACTIVE | Noted: 2018-03-06

## 2018-03-06 PROBLEM — R07.2 PRECORDIAL PAIN: Status: ACTIVE | Noted: 2018-03-06

## 2018-03-14 ENCOUNTER — HOSPITAL ENCOUNTER (OUTPATIENT)
Dept: CARDIOLOGY | Facility: HOSPITAL | Age: 43
Discharge: HOME | End: 2018-03-14
Attending: INTERNAL MEDICINE

## 2018-03-14 ENCOUNTER — HOSPITAL ENCOUNTER (OUTPATIENT)
Dept: CARDIOLOGY | Facility: HOSPITAL | Age: 43
Discharge: HOME | End: 2018-03-14
Attending: INTERNAL MEDICINE | Admitting: INTERNAL MEDICINE

## 2018-03-14 VITALS
WEIGHT: 156 LBS | OXYGEN SATURATION: 98 % | SYSTOLIC BLOOD PRESSURE: 112 MMHG | BODY MASS INDEX: 20.02 KG/M2 | RESPIRATION RATE: 18 BRPM | HEART RATE: 75 BPM | HEIGHT: 74 IN | DIASTOLIC BLOOD PRESSURE: 77 MMHG

## 2018-03-14 LAB
BH CV ECHO MEAS - ACS: 2.2 CM
BH CV ECHO MEAS - AO MAX PG (FULL): 2.4 MMHG
BH CV ECHO MEAS - AO MAX PG: 6.1 MMHG
BH CV ECHO MEAS - AO MEAN PG (FULL): 1 MMHG
BH CV ECHO MEAS - AO MEAN PG: 3 MMHG
BH CV ECHO MEAS - AO ROOT AREA (BSA CORRECTED): 1.7
BH CV ECHO MEAS - AO ROOT AREA: 8.6 CM^2
BH CV ECHO MEAS - AO ROOT DIAM: 3.3 CM
BH CV ECHO MEAS - AO V2 MAX: 123 CM/SEC
BH CV ECHO MEAS - AO V2 MEAN: 84.2 CM/SEC
BH CV ECHO MEAS - AO V2 VTI: 20.9 CM
BH CV ECHO MEAS - AVA(I,A): 2.7 CM^2
BH CV ECHO MEAS - AVA(I,D): 2.7 CM^2
BH CV ECHO MEAS - AVA(V,A): 2.7 CM^2
BH CV ECHO MEAS - AVA(V,D): 2.7 CM^2
BH CV ECHO MEAS - BSA(HAYCOCK): 1.9 M^2
BH CV ECHO MEAS - BSA: 1.9 M^2
BH CV ECHO MEAS - BZI_BMI: 19.5 KILOGRAMS/M^2
BH CV ECHO MEAS - BZI_METRIC_HEIGHT: 188 CM
BH CV ECHO MEAS - BZI_METRIC_WEIGHT: 68.9 KG
BH CV ECHO MEAS - CONTRAST EF (2CH): 60.3 ML/M^2
BH CV ECHO MEAS - CONTRAST EF 4CH: 60.7 ML/M^2
BH CV ECHO MEAS - EDV(CUBED): 39.3 ML
BH CV ECHO MEAS - EDV(MOD-SP2): 58 ML
BH CV ECHO MEAS - EDV(MOD-SP4): 56 ML
BH CV ECHO MEAS - EDV(TEICH): 47.4 ML
BH CV ECHO MEAS - EF(CUBED): 60.2 %
BH CV ECHO MEAS - EF(MOD-SP2): 60.3 %
BH CV ECHO MEAS - EF(MOD-SP4): 60.7 %
BH CV ECHO MEAS - EF(TEICH): 52.9 %
BH CV ECHO MEAS - ESV(CUBED): 15.6 ML
BH CV ECHO MEAS - ESV(MOD-SP2): 23 ML
BH CV ECHO MEAS - ESV(MOD-SP4): 22 ML
BH CV ECHO MEAS - ESV(TEICH): 22.3 ML
BH CV ECHO MEAS - FS: 26.5 %
BH CV ECHO MEAS - IVS/LVPW: 1.3
BH CV ECHO MEAS - IVSD: 1 CM
BH CV ECHO MEAS - LA DIMENSION: 3.2 CM
BH CV ECHO MEAS - LA/AO: 0.97
BH CV ECHO MEAS - LAT PEAK E' VEL: 13.8 CM/SEC
BH CV ECHO MEAS - LV DIASTOLIC VOL/BSA (35-75): 29 ML/M^2
BH CV ECHO MEAS - LV MASS(C)D: 84.9 GRAMS
BH CV ECHO MEAS - LV MASS(C)DI: 43.9 GRAMS/M^2
BH CV ECHO MEAS - LV MAX PG: 3.6 MMHG
BH CV ECHO MEAS - LV MEAN PG: 2 MMHG
BH CV ECHO MEAS - LV SYSTOLIC VOL/BSA (12-30): 11.4 ML/M^2
BH CV ECHO MEAS - LV V1 MAX: 95.3 CM/SEC
BH CV ECHO MEAS - LV V1 MEAN: 63.3 CM/SEC
BH CV ECHO MEAS - LV V1 VTI: 16.4 CM
BH CV ECHO MEAS - LVIDD: 3.4 CM
BH CV ECHO MEAS - LVIDS: 2.5 CM
BH CV ECHO MEAS - LVLD AP2: 7.6 CM
BH CV ECHO MEAS - LVLD AP4: 7.5 CM
BH CV ECHO MEAS - LVLS AP2: 6.1 CM
BH CV ECHO MEAS - LVLS AP4: 6.7 CM
BH CV ECHO MEAS - LVOT AREA (M): 3.5 CM^2
BH CV ECHO MEAS - LVOT AREA: 3.5 CM^2
BH CV ECHO MEAS - LVOT DIAM: 2.1 CM
BH CV ECHO MEAS - LVPWD: 0.8 CM
BH CV ECHO MEAS - MED PEAK E' VEL: 10.6 CM/SEC
BH CV ECHO MEAS - MV A DUR: 0.14 SEC
BH CV ECHO MEAS - MV A MAX VEL: 76.6 CM/SEC
BH CV ECHO MEAS - MV DEC SLOPE: 345 CM/SEC^2
BH CV ECHO MEAS - MV DEC TIME: 0.22 SEC
BH CV ECHO MEAS - MV E MAX VEL: 71.3 CM/SEC
BH CV ECHO MEAS - MV E/A: 0.93
BH CV ECHO MEAS - MV MAX PG: 2.7 MMHG
BH CV ECHO MEAS - MV MEAN PG: 2 MMHG
BH CV ECHO MEAS - MV P1/2T MAX VEL: 86 CM/SEC
BH CV ECHO MEAS - MV P1/2T: 73 MSEC
BH CV ECHO MEAS - MV V2 MAX: 81.9 CM/SEC
BH CV ECHO MEAS - MV V2 MEAN: 66.1 CM/SEC
BH CV ECHO MEAS - MV V2 VTI: 16.2 CM
BH CV ECHO MEAS - MVA P1/2T LCG: 2.6 CM^2
BH CV ECHO MEAS - MVA(P1/2T): 3 CM^2
BH CV ECHO MEAS - MVA(VTI): 3.5 CM^2
BH CV ECHO MEAS - PA ACC TIME: 0.13 SEC
BH CV ECHO MEAS - PA MAX PG (FULL): 0.49 MMHG
BH CV ECHO MEAS - PA MAX PG: 4.4 MMHG
BH CV ECHO MEAS - PA PR(ACCEL): 19.6 MMHG
BH CV ECHO MEAS - PA V2 MAX: 105 CM/SEC
BH CV ECHO MEAS - PULM A REVS DUR: 0.13 SEC
BH CV ECHO MEAS - PULM A REVS VEL: 38.3 CM/SEC
BH CV ECHO MEAS - PULM DIAS VEL: 51.4 CM/SEC
BH CV ECHO MEAS - PULM S/D: 1.3
BH CV ECHO MEAS - PULM SYS VEL: 66.9 CM/SEC
BH CV ECHO MEAS - PVA(V,A): 1.9 CM^2
BH CV ECHO MEAS - PVA(V,D): 1.9 CM^2
BH CV ECHO MEAS - QP/QS: 0.67
BH CV ECHO MEAS - RV MAX PG: 3.9 MMHG
BH CV ECHO MEAS - RV MEAN PG: 2 MMHG
BH CV ECHO MEAS - RV V1 MAX: 99 CM/SEC
BH CV ECHO MEAS - RV V1 MEAN: 73.6 CM/SEC
BH CV ECHO MEAS - RV V1 VTI: 18.9 CM
BH CV ECHO MEAS - RVDD: 2.1 CM
BH CV ECHO MEAS - RVOT AREA: 2 CM^2
BH CV ECHO MEAS - RVOT DIAM: 1.6 CM
BH CV ECHO MEAS - SI(AO): 92.4 ML/M^2
BH CV ECHO MEAS - SI(CUBED): 12.2 ML/M^2
BH CV ECHO MEAS - SI(LVOT): 29.4 ML/M^2
BH CV ECHO MEAS - SI(MOD-SP2): 18.1 ML/M^2
BH CV ECHO MEAS - SI(MOD-SP4): 17.6 ML/M^2
BH CV ECHO MEAS - SI(TEICH): 13 ML/M^2
BH CV ECHO MEAS - SV(AO): 178.8 ML
BH CV ECHO MEAS - SV(CUBED): 23.7 ML
BH CV ECHO MEAS - SV(LVOT): 56.8 ML
BH CV ECHO MEAS - SV(MOD-SP2): 35 ML
BH CV ECHO MEAS - SV(MOD-SP4): 34 ML
BH CV ECHO MEAS - SV(RVOT): 38 ML
BH CV ECHO MEAS - SV(TEICH): 25.1 ML
BH CV ECHO MEAS - TAPSE (>1.6): 1.9 CM2
BH CV XLRA - RV BASE: 2.8 CM
BH CV XLRA - RV LENGTH: 6.8 CM
BH CV XLRA - RV MID: 2.4 CM
BH CV XLRA - TDI S': 12.4 CM/SEC
E/E' RATIO: 6
LEFT ATRIUM VOLUME INDEX: 19 ML/M2
MAXIMAL PREDICTED HEART RATE: 178 BPM
STRESS TARGET HR: 151 BPM

## 2018-03-14 PROCEDURE — 78452 HT MUSCLE IMAGE SPECT MULT: CPT

## 2018-03-14 PROCEDURE — 78452 HT MUSCLE IMAGE SPECT MULT: CPT | Performed by: INTERNAL MEDICINE

## 2018-03-14 PROCEDURE — A9500 TC99M SESTAMIBI: HCPCS | Performed by: INTERNAL MEDICINE

## 2018-03-14 PROCEDURE — 93018 CV STRESS TEST I&R ONLY: CPT | Performed by: INTERNAL MEDICINE

## 2018-03-14 PROCEDURE — 93306 TTE W/DOPPLER COMPLETE: CPT

## 2018-03-14 PROCEDURE — 93016 CV STRESS TEST SUPVJ ONLY: CPT | Performed by: INTERNAL MEDICINE

## 2018-03-14 PROCEDURE — 0 TECHNETIUM SESTAMIBI: Performed by: INTERNAL MEDICINE

## 2018-03-14 PROCEDURE — 93017 CV STRESS TEST TRACING ONLY: CPT

## 2018-03-14 PROCEDURE — 93306 TTE W/DOPPLER COMPLETE: CPT | Performed by: INTERNAL MEDICINE

## 2018-03-14 RX ADMIN — TECHNETIUM TC 99M SESTAMIBI 1 DOSE: 1 INJECTION INTRAVENOUS at 08:40

## 2018-03-14 RX ADMIN — TECHNETIUM TC 99M SESTAMIBI 1 DOSE: 1 INJECTION INTRAVENOUS at 10:54

## 2018-03-15 LAB
BH CV STRESS BP STAGE 1: NORMAL
BH CV STRESS BP STAGE 2: NORMAL
BH CV STRESS BP STAGE 3: NORMAL
BH CV STRESS BP STAGE 4: NORMAL
BH CV STRESS DURATION MIN STAGE 1: 3
BH CV STRESS DURATION MIN STAGE 2: 3
BH CV STRESS DURATION MIN STAGE 3: 3
BH CV STRESS DURATION MIN STAGE 4: 1
BH CV STRESS DURATION SEC STAGE 1: 0
BH CV STRESS DURATION SEC STAGE 2: 0
BH CV STRESS DURATION SEC STAGE 3: 0
BH CV STRESS DURATION SEC STAGE 4: 17
BH CV STRESS GRADE STAGE 1: 10
BH CV STRESS GRADE STAGE 2: 12
BH CV STRESS GRADE STAGE 3: 14
BH CV STRESS GRADE STAGE 4: 15
BH CV STRESS HR STAGE 1: 98
BH CV STRESS HR STAGE 2: 113
BH CV STRESS HR STAGE 3: 148
BH CV STRESS HR STAGE 4: 160
BH CV STRESS METS STAGE 1: 4.6
BH CV STRESS METS STAGE 2: 7
BH CV STRESS METS STAGE 3: 10.1
BH CV STRESS METS STAGE 4: 11.5
BH CV STRESS PROTOCOL 1: NORMAL
BH CV STRESS RECOVERY BP: NORMAL MMHG
BH CV STRESS RECOVERY HR: 92 BPM
BH CV STRESS RECOVERY O2: 98 %
BH CV STRESS SPEED STAGE 1: 1.7
BH CV STRESS SPEED STAGE 2: 2.5
BH CV STRESS SPEED STAGE 3: 3.4
BH CV STRESS SPEED STAGE 4: 4.1
BH CV STRESS STAGE 1: 1
BH CV STRESS STAGE 2: NORMAL
BH CV STRESS STAGE 3: 3
BH CV STRESS STAGE 4: 4
LV EF NUC BP: 67 %
MAXIMAL PREDICTED HEART RATE: 178 BPM
PERCENT MAX PREDICTED HR: 89.89 %
STRESS BASELINE BP: NORMAL MMHG
STRESS BASELINE HR: 69 BPM
STRESS O2 SAT REST: 98 %
STRESS PERCENT HR: 106 %
STRESS POST ESTIMATED WORKLOAD: 11.5 METS
STRESS POST EXERCISE DUR MIN: 10 MIN
STRESS POST EXERCISE DUR SEC: 17 SEC
STRESS POST PEAK BP: NORMAL MMHG
STRESS POST PEAK HR: 160 BPM
STRESS TARGET HR: 151 BPM

## 2018-03-20 ENCOUNTER — OFFICE VISIT (OUTPATIENT)
Dept: CARDIOLOGY | Facility: CLINIC | Age: 43
End: 2018-03-20

## 2018-03-20 VITALS
HEART RATE: 83 BPM | HEIGHT: 74 IN | SYSTOLIC BLOOD PRESSURE: 104 MMHG | WEIGHT: 160 LBS | DIASTOLIC BLOOD PRESSURE: 73 MMHG | BODY MASS INDEX: 20.53 KG/M2

## 2018-03-20 DIAGNOSIS — R07.2 PRECORDIAL PAIN: ICD-10-CM

## 2018-03-20 DIAGNOSIS — G47.33 OBSTRUCTIVE SLEEP APNEA SYNDROME: Primary | ICD-10-CM

## 2018-03-20 PROCEDURE — 99212 OFFICE O/P EST SF 10 MIN: CPT | Performed by: INTERNAL MEDICINE

## 2018-03-20 NOTE — PROGRESS NOTES
" Subjective:       Korey Carvajal is a 42 y.o. male who here for follow up    CC  The follow-up for the obstructive sleep study as well as atypical chest pain  HPI  42-year-old male with a known history of sleep apnea as well as atypical chest pain has a negative workup after going to the recent stress test and echocardiogram     Problem List Items Addressed This Visit        Respiratory    Obstructive sleep apnea syndrome - Primary       Nervous and Auditory    Precordial pain      Other Visit Diagnoses    None.       .    The following portions of the patient's history were reviewed and updated as appropriate: allergies, current medications, past family history, past medical history, past social history, past surgical history and problem list.    Past Medical History:   Diagnosis Date   • Anxiety    • Asthma    • Bulging lumbar disc    • Bulging of cervical intervertebral disc    • Depression    • Mid back pain    • Neck pain     reports that he has been smoking.  He has a 30.00 pack-year smoking history. He has never used smokeless tobacco. He reports that he drinks alcohol. He reports that he uses drugs, including Marijuana.  Family History   Problem Relation Age of Onset   • Hypertension Mother    • Hyperlipidemia Mother    • Hypertension Father    • Hyperlipidemia Father    • Heart disease Father    • Heart attack Maternal Grandmother    • Heart attack Maternal Grandfather        Review of Systems  Constitutional: No wt loss, fever, fatigue  Gastrointestinal: No nausea, abdominal pain  Behavioral/Psych: No insomnia or anxiety   Cardiovascular Atypical chest pain  Objective:       Physical Exam             Physical Exam  /73   Pulse 83   Ht 188 cm (74\")   Wt 72.6 kg (160 lb)   BMI 20.54 kg/m²     General appearance: NAD, conversant   Eyes: anicteric sclerae, moist conjunctivae; no lid-lag; PERRLA   HENT: Atraumatic; oropharynx clear with moist mucous membranes and no mucosal ulcerations;  normal " hard and soft palate   Neck: Trachea midline; FROM, supple, no thyromegaly or lymphadenopathy   Lungs: CTA, with normal respiratory effort and no intercostal retractions   CV: S1-S2 regular, no murmurs, no rub, no gallop   Abdomen: Soft, non-tender; no masses or HSM   Extremities: No peripheral edema or extremity lymphadenopathy  Skin: Normal temperature, turgor and texture; no rash, ulcers or subcutaneous nodules   Psych: Appropriate affect, alert and oriented to person, place and time           Cardiographics  @Procedures    Echocardiogram:        Current Outpatient Prescriptions:   •  cyclobenzaprine (FLEXERIL) 10 MG tablet, take 1 tablet by mouth twice a day, Disp: , Rfl: 0  •  diazePAM (VALIUM) 5 MG tablet, take 1 tablet by mouth once daily, Disp: , Rfl: 0  •  sertraline (ZOLOFT) 50 MG tablet, take 1 tablet by mouth once daily, Disp: , Rfl: 0  •  vitamin D (ERGOCALCIFEROL) 79301 UNITS capsule capsule, take 1 capsule by mouth every week, Disp: , Rfl: 0   Assessment:        Patient Active Problem List   Diagnosis   • Pain in thoracic spine at multiple sites   • Thoracic radiculitis   • DDD (degenerative disc disease), lumbar   • Neck pain   • Cervical facet joint syndrome   • Arthralgia   • Precordial pain   • Obstructive sleep apnea syndrome     Interpretation Summary        · Low risk for ischemic heart disease.  · Findings consistent with a normal ECG stress test.  · Left ventricular ejection fraction is normal (Calculated EF = 67%).  · Myocardial perfusion imaging indicates a normal myocardial perfusion study with no evidence of ischemia.  · Impressions are consistent with a low risk study.     Interpretation Summary     · Bubble study neg for PFO  · Calculated EF = 60.7%.  · Trace-to-mild mitral valve regurgitation is present.  · There is no evidence of pericardial effusion     · A normal monitor study.          Plan:            ICD-10-CM ICD-9-CM   1. Obstructive sleep apnea syndrome G47.33 327.23   2.  Precordial pain R07.2 786.51     1. Obstructive sleep apnea syndrome  Being followed by the primary-care physician    2. Precordial pain  Atypical       See 1 yr    Will follow up for sleep study  COUNSELING:    Korey Guzman was given to patient for the following topics: diagnostic results, risk factor reductions, impressions, risks and benefits of treatment options and importance of treatment compliance .       SMOKING COUNSELING:    Ready to quit: No  Counseling given: Yes      EMR Dragon/Transcription disclaimer:   Much of this encounter note is an electronic transcription/translation of spoken language to printed text. The electronic translation of spoken language may permit erroneous, or at times, nonsensical words or phrases to be inadvertently transcribed; Although I have reviewed the note for such errors, some may still exist.

## 2018-03-22 ENCOUNTER — APPOINTMENT (OUTPATIENT)
Dept: SLEEP MEDICINE | Facility: HOSPITAL | Age: 43
End: 2018-03-22
Attending: INTERNAL MEDICINE

## 2018-03-30 ENCOUNTER — OFFICE VISIT (OUTPATIENT)
Dept: SLEEP MEDICINE | Facility: HOSPITAL | Age: 43
End: 2018-03-30
Attending: INTERNAL MEDICINE

## 2018-03-30 VITALS
HEIGHT: 74 IN | BODY MASS INDEX: 20.41 KG/M2 | DIASTOLIC BLOOD PRESSURE: 81 MMHG | SYSTOLIC BLOOD PRESSURE: 135 MMHG | HEART RATE: 88 BPM | WEIGHT: 159 LBS

## 2018-03-30 DIAGNOSIS — R06.81 APNEA: ICD-10-CM

## 2018-03-30 DIAGNOSIS — G47.10 HYPERSOMNOLENCE: ICD-10-CM

## 2018-03-30 DIAGNOSIS — R06.83 SNORING: ICD-10-CM

## 2018-03-30 DIAGNOSIS — Z78.9 EXCESSIVE CAFFEINE INTAKE: ICD-10-CM

## 2018-03-30 DIAGNOSIS — G47.33 OSA (OBSTRUCTIVE SLEEP APNEA): Primary | ICD-10-CM

## 2018-03-30 PROCEDURE — G0463 HOSPITAL OUTPT CLINIC VISIT: HCPCS

## 2018-04-04 ENCOUNTER — TELEPHONE (OUTPATIENT)
Dept: SLEEP MEDICINE | Facility: HOSPITAL | Age: 43
End: 2018-04-04

## 2018-05-31 ENCOUNTER — TELEPHONE (OUTPATIENT)
Dept: SLEEP MEDICINE | Facility: HOSPITAL | Age: 43
End: 2018-05-31

## 2022-04-23 ENCOUNTER — APPOINTMENT (OUTPATIENT)
Dept: GENERAL RADIOLOGY | Facility: HOSPITAL | Age: 47
End: 2022-04-23

## 2022-04-23 ENCOUNTER — HOSPITAL ENCOUNTER (EMERGENCY)
Facility: HOSPITAL | Age: 47
Discharge: HOME | End: 2022-04-23
Attending: EMERGENCY MEDICINE | Admitting: EMERGENCY MEDICINE

## 2022-04-23 VITALS
SYSTOLIC BLOOD PRESSURE: 106 MMHG | HEIGHT: 74 IN | OXYGEN SATURATION: 97 % | RESPIRATION RATE: 17 BRPM | WEIGHT: 155.2 LBS | HEART RATE: 68 BPM | BODY MASS INDEX: 19.92 KG/M2 | TEMPERATURE: 97.9 F | DIASTOLIC BLOOD PRESSURE: 81 MMHG

## 2022-04-23 DIAGNOSIS — R07.9 CHEST PAIN IN ADULT: Primary | ICD-10-CM

## 2022-04-23 LAB
ALBUMIN SERPL-MCNC: 4.4 G/DL (ref 3.5–5.2)
ALBUMIN/GLOB SERPL: 1.8 G/DL
ALP SERPL-CCNC: 90 U/L (ref 39–117)
ALT SERPL W P-5'-P-CCNC: 25 U/L (ref 1–41)
ANION GAP SERPL CALCULATED.3IONS-SCNC: 12.2 MMOL/L (ref 5–15)
AST SERPL-CCNC: 28 U/L (ref 1–40)
BASOPHILS # BLD AUTO: 0.07 10*3/MM3 (ref 0–0.2)
BASOPHILS NFR BLD AUTO: 0.6 % (ref 0–1.5)
BILIRUB SERPL-MCNC: 1.1 MG/DL (ref 0–1.2)
BUN SERPL-MCNC: 7 MG/DL (ref 6–20)
BUN/CREAT SERPL: 8.4 (ref 7–25)
CALCIUM SPEC-SCNC: 9.5 MG/DL (ref 8.6–10.5)
CHLORIDE SERPL-SCNC: 101 MMOL/L (ref 98–107)
CO2 SERPL-SCNC: 24.8 MMOL/L (ref 22–29)
CREAT SERPL-MCNC: 0.83 MG/DL (ref 0.76–1.27)
D DIMER PPP FEU-MCNC: <0.27 MCGFEU/ML (ref 0–0.49)
DEPRECATED RDW RBC AUTO: 41.1 FL (ref 37–54)
EGFRCR SERPLBLD CKD-EPI 2021: 109.3 ML/MIN/1.73
EOSINOPHIL # BLD AUTO: 0.26 10*3/MM3 (ref 0–0.4)
EOSINOPHIL NFR BLD AUTO: 2.3 % (ref 0.3–6.2)
ERYTHROCYTE [DISTWIDTH] IN BLOOD BY AUTOMATED COUNT: 12.1 % (ref 12.3–15.4)
GLOBULIN UR ELPH-MCNC: 2.4 GM/DL
GLUCOSE SERPL-MCNC: 97 MG/DL (ref 65–99)
HCT VFR BLD AUTO: 47.4 % (ref 37.5–51)
HGB BLD-MCNC: 17.1 G/DL (ref 13–17.7)
IMM GRANULOCYTES # BLD AUTO: 0.03 10*3/MM3 (ref 0–0.05)
IMM GRANULOCYTES NFR BLD AUTO: 0.3 % (ref 0–0.5)
LIPASE SERPL-CCNC: 22 U/L (ref 13–60)
LYMPHOCYTES # BLD AUTO: 1.87 10*3/MM3 (ref 0.7–3.1)
LYMPHOCYTES NFR BLD AUTO: 16.5 % (ref 19.6–45.3)
MCH RBC QN AUTO: 33.4 PG (ref 26.6–33)
MCHC RBC AUTO-ENTMCNC: 36.1 G/DL (ref 31.5–35.7)
MCV RBC AUTO: 92.6 FL (ref 79–97)
MONOCYTES # BLD AUTO: 0.92 10*3/MM3 (ref 0.1–0.9)
MONOCYTES NFR BLD AUTO: 8.1 % (ref 5–12)
NEUTROPHILS NFR BLD AUTO: 72.2 % (ref 42.7–76)
NEUTROPHILS NFR BLD AUTO: 8.19 10*3/MM3 (ref 1.7–7)
NRBC BLD AUTO-RTO: 0 /100 WBC (ref 0–0.2)
PLATELET # BLD AUTO: 242 10*3/MM3 (ref 140–450)
PMV BLD AUTO: 9.6 FL (ref 6–12)
POTASSIUM SERPL-SCNC: 4.3 MMOL/L (ref 3.5–5.2)
PROT SERPL-MCNC: 6.8 G/DL (ref 6–8.5)
QT INTERVAL: 363 MS
RBC # BLD AUTO: 5.12 10*6/MM3 (ref 4.14–5.8)
SODIUM SERPL-SCNC: 138 MMOL/L (ref 136–145)
TROPONIN T SERPL-MCNC: <0.01 NG/ML (ref 0–0.03)
WBC NRBC COR # BLD: 11.34 10*3/MM3 (ref 3.4–10.8)

## 2022-04-23 PROCEDURE — 84484 ASSAY OF TROPONIN QUANT: CPT | Performed by: EMERGENCY MEDICINE

## 2022-04-23 PROCEDURE — 93010 ELECTROCARDIOGRAM REPORT: CPT | Performed by: INTERNAL MEDICINE

## 2022-04-23 PROCEDURE — 99283 EMERGENCY DEPT VISIT LOW MDM: CPT

## 2022-04-23 PROCEDURE — 80053 COMPREHEN METABOLIC PANEL: CPT | Performed by: EMERGENCY MEDICINE

## 2022-04-23 PROCEDURE — 85025 COMPLETE CBC W/AUTO DIFF WBC: CPT | Performed by: EMERGENCY MEDICINE

## 2022-04-23 PROCEDURE — 93005 ELECTROCARDIOGRAM TRACING: CPT

## 2022-04-23 PROCEDURE — 83690 ASSAY OF LIPASE: CPT | Performed by: EMERGENCY MEDICINE

## 2022-04-23 PROCEDURE — 71045 X-RAY EXAM CHEST 1 VIEW: CPT

## 2022-04-23 PROCEDURE — 85379 FIBRIN DEGRADATION QUANT: CPT | Performed by: EMERGENCY MEDICINE

## 2022-04-23 NOTE — ED TRIAGE NOTES
Pt reports chest pain that started 2 days ago.     Pt was wearing a mask during assessment.  This RN wore appropriate PPE

## 2022-06-01 ENCOUNTER — OFFICE VISIT (OUTPATIENT)
Dept: CARDIOLOGY | Facility: CLINIC | Age: 47
End: 2022-06-01

## 2022-06-01 VITALS
WEIGHT: 158.4 LBS | HEART RATE: 83 BPM | DIASTOLIC BLOOD PRESSURE: 78 MMHG | BODY MASS INDEX: 20.33 KG/M2 | SYSTOLIC BLOOD PRESSURE: 122 MMHG | HEIGHT: 74 IN

## 2022-06-01 DIAGNOSIS — R00.2 PALPITATIONS: Primary | ICD-10-CM

## 2022-06-01 PROCEDURE — 93000 ELECTROCARDIOGRAM COMPLETE: CPT | Performed by: INTERNAL MEDICINE

## 2022-06-01 PROCEDURE — 99204 OFFICE O/P NEW MOD 45 MIN: CPT | Performed by: INTERNAL MEDICINE

## 2022-06-01 NOTE — PROGRESS NOTES
PATIENTINFORMATION    Date of Office Visit: 2022  Encounter Provider: Kevin Norwood MD  Place of Service: [facility] CARDIOLOGY  Patient Name: Korey Carvajal  : 1975    Subjective:     Encounter Date:2022      Patient ID: Korey Carvajal is a 46 y.o. male.    Chief Complaint   Patient presents with   • new patient   • Palpitations     HPI  Mr. Carvajal is a pleasant 45 yo gentleman who is here for evaluation of palpitations.  He had an episode of severe palpitation at work that he describes as heart beating fast and felt he was going to pass out and the episode lasted for about 5 minutes.  He has some chest discomfort during the episode and felt very fatigued for subsequent 2 days.  He went to the ER next day and all work-up was unremarkable including EKG and troponins but palpitation has already resolved.  He denies any significant recurrence since then.  He reports having intermittent episodes of skipped beats but none of them lasted that long.  He denies any exacerbating or relieving factors.      ROS  All systems reviewed and negative except as noted in HPI.    Past Medical History:   Diagnosis Date   • Anxiety    • Asthma    • Bulging lumbar disc    • Bulging of cervical intervertebral disc    • Depression    • Mid back pain    • Neck pain        History reviewed. No pertinent surgical history.    Social History     Socioeconomic History   • Marital status: Single   Tobacco Use   • Smoking status: Current Every Day Smoker     Packs/day: 1.00     Years: 30.00     Pack years: 30.00   • Smokeless tobacco: Never Used   Substance and Sexual Activity   • Alcohol use: Yes     Comment: beer several times per week   • Drug use: Yes     Types: Marijuana   • Sexual activity: Defer       Family History   Problem Relation Age of Onset   • Hypertension Mother    • Hyperlipidemia Mother    • Hypertension Father    • Hyperlipidemia Father    • Heart disease Father    • Heart  "attack Maternal Grandmother    • Heart attack Maternal Grandfather            ECG 12 Lead    Date/Time: 6/1/2022 11:00 AM  Performed by: Kevin Norwood MD  Authorized by: Kevin Norwood MD   Comparison: compared with previous ECG from 4/23/2022  Similar to previous ECG  Rhythm: sinus rhythm  Rate: normal  Conduction: conduction normal  ST Segments: ST segments normal  T Waves: T waves normal  QRS axis: normal  Other: no other findings    Clinical impression: normal ECG               Objective:     /78 (BP Location: Left arm, Patient Position: Sitting)   Pulse 83   Ht 188 cm (74\")   Wt 71.8 kg (158 lb 6.4 oz)   BMI 20.34 kg/m²  Body mass index is 20.34 kg/m².     Constitutional:       General: Not in acute distress.     Appearance: Well-developed. Not diaphoretic.   Eyes:      Pupils: Pupils are equal, round, and reactive to light.   HENT:      Head: Normocephalic and atraumatic.   Neck:      Thyroid: No thyromegaly.   Pulmonary:      Effort: Pulmonary effort is normal. No respiratory distress.      Breath sounds: Normal breath sounds. No wheezing. No rales.   Chest:      Chest wall: Not tender to palpatation.   Cardiovascular:      Normal rate. Regular rhythm.      No gallop.   Pulses:     Intact distal pulses.   Edema:     Peripheral edema absent.   Abdominal:      General: Bowel sounds are normal. There is no distension.      Palpations: Abdomen is soft.      Tenderness: There is no guarding.   Musculoskeletal: Normal range of motion.         General: No deformity.      Cervical back: Normal range of motion and neck supple. Skin:     General: Skin is warm and dry.      Findings: No rash.   Neurological:      Mental Status: Alert and oriented to person, place, and time.      Cranial Nerves: No cranial nerve deficit.      Deep Tendon Reflexes: Reflexes are normal and symmetric.   Psychiatric:         Judgment: Judgment normal.         Review Of Data: I have reviewed pertinent recent labs, " images and documents and pertinent findings included in HPI or assessment below.          Assessment/Plan:           1.  Palpitation with presyncope-on 4/22/2022-no recurrence and no significant prior episode  He had a normal 10-minute exercise myocardial perfusion study and echocardiogram in March 2018  I will send him on a 48-hour Holter monitor to see if he has any significant asymptomatic arrhythmias  He will continue to monitor his symptoms and report with any recurrent symptoms.    2.  Tobacco use: Counseled      Diagnosis and plan of care discussed with patient and verbalized understanding.            Your medication list          Accurate as of June 1, 2022 11:10 AM. If you have any questions, ask your nurse or doctor.            CONTINUE taking these medications      Instructions Last Dose Given Next Dose Due   cyclobenzaprine 10 MG tablet  Commonly known as: FLEXERIL      take 1 tablet by mouth twice a day       diazePAM 5 MG tablet  Commonly known as: VALIUM      take 1 tablet by mouth once daily       sertraline 50 MG tablet  Commonly known as: ZOLOFT      take 1 tablet by mouth once daily       vitamin D 1.25 MG (68719 UT) capsule capsule  Commonly known as: ERGOCALCIFEROL      take 1 capsule by mouth every week                  Kevin Norwood MD  06/01/22  11:10 EDT

## 2022-06-03 ENCOUNTER — PATIENT ROUNDING (BHMG ONLY) (OUTPATIENT)
Dept: CARDIOLOGY | Facility: CLINIC | Age: 47
End: 2022-06-03

## 2022-06-03 NOTE — PROGRESS NOTES
Amber 3, 2022    Hello, may I speak with Korey Carvajal?    My name is Yasmine    I am  with [facility] CARDIOLOGY  [address]  [place] [address] [phone].    Before we get started may I verify your date of birth? 1975    I am calling to officially welcome you to our practice and ask about your recent visit. Is this a good time to talk? yes    Tell me about your visit with us. What things went well?  Everything went fine.       We're always looking for ways to make our patients' experiences even better. Do you have recommendations on ways we may improve?  no    Overall were you satisfied with your first visit to our practice? yes       I appreciate you taking the time to speak with me today. Is there anything else I can do for you? no      Thank you, and have a great day.

## 2024-12-11 NOTE — ED PROVIDER NOTES
Faxed revised aps for [employer] to [phone]     Claim #  [id number]          EMERGENCY DEPARTMENT ENCOUNTER    Room Number:  34/34  Date of encounter:  4/23/2022  PCP: Deni Parkinson MD  Historian: Patient      HPI:  Chief Complaint: Chest pain  A complete HPI/ROS/PMH/PSH/SH/FH are unobtainable due to: Nothing    Context: Korey Carvajal is a 46 y.o. male who presents to the ED c/o episode of chest discomfort which started 2 days ago while he was at work.  Patient describes an episode where he just spontaneously started having palpitations, shortness of breath, diaphoresis and lightheadedness.  This lasted approximately 10 minutes and then improved.  Since then he describes feeling very fatigued with palpitations and some sharp discomfort on the right side of the sternum that does not radiate.  Nothing seems to make it better or worse.    Patient has a strong family history of early onset coronary artery disease but no personal history.  He has been seen by cardiologist in the past and has worn a Holter monitor but to my knowledge in his recollection he has had no provocative or stress testing in the past.  He is a smoker.      PAST MEDICAL HISTORY  Active Ambulatory Problems     Diagnosis Date Noted   • Pain in thoracic spine at multiple sites 08/07/2017   • Thoracic radiculitis 08/07/2017   • DDD (degenerative disc disease), lumbar 09/13/2017   • Neck pain 10/02/2017   • Cervical facet joint syndrome 10/02/2017   • Arthralgia 10/02/2017   • Precordial pain 03/06/2018   • Obstructive sleep apnea syndrome 03/06/2018     Resolved Ambulatory Problems     Diagnosis Date Noted   • No Resolved Ambulatory Problems     Past Medical History:   Diagnosis Date   • Anxiety    • Asthma    • Bulging lumbar disc    • Bulging of cervical intervertebral disc    • Depression    • Mid back pain          PAST SURGICAL HISTORY  History reviewed. No pertinent surgical history.      FAMILY HISTORY  Family History   Problem Relation Age of Onset   • Hypertension Mother    • Hyperlipidemia Mother    •  Hypertension Father    • Hyperlipidemia Father    • Heart disease Father    • Heart attack Maternal Grandmother    • Heart attack Maternal Grandfather          SOCIAL HISTORY  Social History     Socioeconomic History   • Marital status: Single   Tobacco Use   • Smoking status: Current Every Day Smoker     Packs/day: 1.00     Years: 30.00     Pack years: 30.00   • Smokeless tobacco: Never Used   Substance and Sexual Activity   • Alcohol use: Yes     Comment: beer several times per week   • Drug use: Yes     Types: Marijuana   • Sexual activity: Defer         ALLERGIES  Patient has no known allergies.        REVIEW OF SYSTEMS  Review of Systems     All systems reviewed and negative except for those discussed in HPI.       PHYSICAL EXAM    I have reviewed the triage vital signs and nursing notes.    ED Triage Vitals   Temp Heart Rate Resp BP SpO2   04/23/22 0817 04/23/22 0815 04/23/22 0815 -- 04/23/22 0815   97.9 °F (36.6 °C) 92 16  97 %      Temp src Heart Rate Source Patient Position BP Location FiO2 (%)   04/23/22 0817 -- -- -- --   Tympanic           Physical Exam  GENERAL: Alert and anxious  HENT: nares patent  EYES: no scleral icterus  CV: regular rhythm, regular rate  RESPIRATORY: normal effort  ABDOMEN: soft  MUSCULOSKELETAL: no deformity  NEURO: alert, moves all extremities, follows commands  SKIN: warm, dry        LAB RESULTS  Recent Results (from the past 24 hour(s))   ECG 12 Lead    Collection Time: 04/23/22  8:20 AM   Result Value Ref Range    QT Interval 363 ms   Comprehensive Metabolic Panel    Collection Time: 04/23/22  8:45 AM    Specimen: Blood   Result Value Ref Range    Glucose 97 65 - 99 mg/dL    BUN 7 6 - 20 mg/dL    Creatinine 0.83 0.76 - 1.27 mg/dL    Sodium 138 136 - 145 mmol/L    Potassium 4.3 3.5 - 5.2 mmol/L    Chloride 101 98 - 107 mmol/L    CO2 24.8 22.0 - 29.0 mmol/L    Calcium 9.5 8.6 - 10.5 mg/dL    Total Protein 6.8 6.0 - 8.5 g/dL    Albumin 4.40 3.50 - 5.20 g/dL    ALT (SGPT) 25 1 -  41 U/L    AST (SGOT) 28 1 - 40 U/L    Alkaline Phosphatase 90 39 - 117 U/L    Total Bilirubin 1.1 0.0 - 1.2 mg/dL    Globulin 2.4 gm/dL    A/G Ratio 1.8 g/dL    BUN/Creatinine Ratio 8.4 7.0 - 25.0    Anion Gap 12.2 5.0 - 15.0 mmol/L    eGFR 109.3 >60.0 mL/min/1.73   Lipase    Collection Time: 04/23/22  8:45 AM    Specimen: Blood   Result Value Ref Range    Lipase 22 13 - 60 U/L   Troponin    Collection Time: 04/23/22  8:45 AM    Specimen: Blood   Result Value Ref Range    Troponin T <0.010 0.000 - 0.030 ng/mL   CBC Auto Differential    Collection Time: 04/23/22  8:45 AM    Specimen: Blood   Result Value Ref Range    WBC 11.34 (H) 3.40 - 10.80 10*3/mm3    RBC 5.12 4.14 - 5.80 10*6/mm3    Hemoglobin 17.1 13.0 - 17.7 g/dL    Hematocrit 47.4 37.5 - 51.0 %    MCV 92.6 79.0 - 97.0 fL    MCH 33.4 (H) 26.6 - 33.0 pg    MCHC 36.1 (H) 31.5 - 35.7 g/dL    RDW 12.1 (L) 12.3 - 15.4 %    RDW-SD 41.1 37.0 - 54.0 fl    MPV 9.6 6.0 - 12.0 fL    Platelets 242 140 - 450 10*3/mm3    Neutrophil % 72.2 42.7 - 76.0 %    Lymphocyte % 16.5 (L) 19.6 - 45.3 %    Monocyte % 8.1 5.0 - 12.0 %    Eosinophil % 2.3 0.3 - 6.2 %    Basophil % 0.6 0.0 - 1.5 %    Immature Grans % 0.3 0.0 - 0.5 %    Neutrophils, Absolute 8.19 (H) 1.70 - 7.00 10*3/mm3    Lymphocytes, Absolute 1.87 0.70 - 3.10 10*3/mm3    Monocytes, Absolute 0.92 (H) 0.10 - 0.90 10*3/mm3    Eosinophils, Absolute 0.26 0.00 - 0.40 10*3/mm3    Basophils, Absolute 0.07 0.00 - 0.20 10*3/mm3    Immature Grans, Absolute 0.03 0.00 - 0.05 10*3/mm3    nRBC 0.0 0.0 - 0.2 /100 WBC   D-dimer, Quantitative    Collection Time: 04/23/22  8:45 AM    Specimen: Blood   Result Value Ref Range    D-Dimer, Quantitative <0.27 0.00 - 0.49 MCGFEU/mL       Ordered the above labs and independently reviewed the results.        RADIOLOGY  XR Chest 1 View    Result Date: 4/23/2022  XR CHEST 1 VW-  HISTORY: Male who is 46 years-old,  chest pain  TECHNIQUE: Frontal views of the chest  COMPARISON: None available   FINDINGS: Heart, mediastinum and pulmonary vasculature are unremarkable. No focal pulmonary consolidation, pleural effusion, or pneumothorax. Pulmonary hyperinflation could reflect asthma or COPD, correlate clinically. No acute osseous process.      No focal pulmonary consolidation. Pulmonary hyperinflation. Follow-up as clinical indications persist.  This report was finalized on 4/23/2022 8:42 AM by Dr. Eduard Sanchez M.D.        I ordered the above noted radiological studies. Reviewed by me and discussed with radiologist.  See dictation for official radiology interpretation.      PROCEDURES    Procedures      MEDICATIONS GIVEN IN ER    Medications - No data to display      PROGRESS, DATA ANALYSIS, CONSULTS, AND MEDICAL DECISION MAKING    All labs have been independently reviewed by me.  All radiology studies have been reviewed by me and discussed with radiologist dictating the report.   EKG's independently viewed and interpreted by me.  Discussion below represents my analysis of pertinent findings related to patient's condition, differential diagnosis, treatment plan and final disposition.        ED Course as of 04/23/22 1623   Sat Apr 23, 2022   0824 EKG shows no STEMI [DP]   1622 CBC and chemistry unremarkable [DP]   1622 Troponin and D-dimer negative [DP]   1622 Chest x-ray negative [DP]   1622 EKG on arrival  Normal sinus rhythm  Normal ID, QRS and QT no acute ST segment changes are seen to suggest ischemia, and there is no change when compared to previous dated March 16, 2018   [DP]   1622 Patient has a heart score of 2 only because of age and family history.  He has had ongoing atypical symptoms for about 2 days now, and he is very anxious.  I did offer to admit him to the observation unit to see a cardiologist for further evaluation, but the patient decided that he would prefer to go home excepting that there is at least some risk of acute cardiac event during that timeframe [DP]      ED Course User  Index  [DP] Moose Melendrez MD           PPE: The patient wore a surgical mask throughout the entire patient encounter. I wore an N95.    AS OF 16:23 EDT VITALS:    BP - 106/81  HR - 68  TEMP - 97.9 °F (36.6 °C) (Tympanic)  O2 SATS - 97%        DIAGNOSIS  Final diagnoses:   Chest pain in adult         DISPOSITION  Admit to ED observation           Moose Melendrez MD  04/23/22 [id number]